# Patient Record
Sex: FEMALE | Race: WHITE | Employment: FULL TIME | URBAN - METROPOLITAN AREA
[De-identification: names, ages, dates, MRNs, and addresses within clinical notes are randomized per-mention and may not be internally consistent; named-entity substitution may affect disease eponyms.]

---

## 2021-04-21 ENCOUNTER — HOSPITAL ENCOUNTER (OUTPATIENT)
Age: 41
Discharge: HOME | End: 2021-04-23

## 2021-10-11 ENCOUNTER — OFFICE VISIT (OUTPATIENT)
Dept: PRIMARY CARE CLINIC | Age: 41
End: 2021-10-11
Payer: COMMERCIAL

## 2021-10-11 VITALS
RESPIRATION RATE: 18 BRPM | OXYGEN SATURATION: 98 % | BODY MASS INDEX: 38.64 KG/M2 | HEART RATE: 112 BPM | TEMPERATURE: 98 F | HEIGHT: 60 IN | SYSTOLIC BLOOD PRESSURE: 104 MMHG | DIASTOLIC BLOOD PRESSURE: 80 MMHG | WEIGHT: 196.8 LBS

## 2021-10-11 DIAGNOSIS — R05.9 COUGH: Primary | ICD-10-CM

## 2021-10-11 DIAGNOSIS — J39.2 THROAT IRRITATION: ICD-10-CM

## 2021-10-11 DIAGNOSIS — R43.0 LOSS OF SMELL: ICD-10-CM

## 2021-10-11 DIAGNOSIS — R43.2 LOSS OF TASTE: ICD-10-CM

## 2021-10-11 LAB
Lab: ABNORMAL
PERFORMING INSTRUMENT: ABNORMAL
QC PASS/FAIL: ABNORMAL
SARS-COV-2, POC: DETECTED

## 2021-10-11 PROCEDURE — 87426 SARSCOV CORONAVIRUS AG IA: CPT | Performed by: NURSE PRACTITIONER

## 2021-10-11 PROCEDURE — 99213 OFFICE O/P EST LOW 20 MIN: CPT | Performed by: NURSE PRACTITIONER

## 2021-10-11 RX ORDER — BENZONATATE 100 MG/1
100 CAPSULE ORAL 3 TIMES DAILY PRN
Qty: 30 CAPSULE | Refills: 0 | Status: SHIPPED | OUTPATIENT
Start: 2021-10-11 | End: 2021-10-18

## 2021-10-11 NOTE — PROGRESS NOTES
Chief Complaint   Other (cough, mucous,loss of taste and smell,irritated throat) and Other (onset symptoms on 10/03)      History of Present Illness   Source of history provided by:  patient. Claudia Patel is a 37 y.o. old female who presents to the walk in clinic with complaints of Chills, Headache, Pharyngitis, Post nasal drip, productive Cough, Patient was exposed to someone who is a known Covid-19 infected person or directly caring for such person and loss of smell and taste x 9 days. States symptoms have stayed the same since onset. Has been taking Mucinex DM, which has been  not very effective without symptomatic relief. Denies any Fever, Shortness of breath, Nausea, Vomiting, Chest Pain, LE Edema, Abdominal Pain, Rash or Lethargy. Denies any hx of asthma, COPD, emphysema or pneumonia. ROS   Pertinent positives and negatives are stated within HPI, all other systems reviewed and are negative. Past Medical History:  has no past medical history on file. Past Surgical History:  has no past surgical history on file. Social History:  reports that she has never smoked. She has never used smokeless tobacco.  Family History: family history is not on file. Allergies: Azithromycin, Erythromycin, Iodine, and Shellfish allergy    Physical Exam   Vital Signs:  /80   Pulse 112   Temp 98 °F (36.7 °C)   Resp 18   Ht 5' 0.05\" (1.525 m)   Wt 196 lb 12.8 oz (89.3 kg)   SpO2 98%   BMI 38.37 kg/m²    Oxygen Saturation Interpretation: Normal.    Constitutional:  Alert, development consistent with age. NAD. Head:  NC/NT. Airway patent. Ears: TMs clear bilaterally. Canals without exudate or swelling bilaterally. Mouth: Posterior pharynx with mild erythema and clear postnasal drip. No tonsillar hypertrophy or exudate. Neck:  Normal ROM. Supple. No anterior cervical adenopathy noted. Lungs: CTAB without wheezes, rales, or rhonchi.    CV:  Tachycardic rate, but normal rhythm, normal heart sounds, without pathological murmurs, ectopy, gallops, or rubs. Skin:  Normal turgor. Warm, dry, without visible rash. Lymphatic: No lymphangitis or adenopathy noted. Neurological:  Oriented. Motor functions intact. Lab / Imaging Results   (All laboratory and radiology results have been personally reviewed by myself)  Labs:  Results for orders placed or performed in visit on 10/11/21   POCT COVID-19, Antigen   Result Value Ref Range    SARS-COV-2, POC Detected (A) Not Detected    Lot Number [id number]     QC Pass/Fail pass     Performing Instrument BD Veritor        Imaging: All Radiology results interpreted by Radiologist unless otherwise noted. No results found. Medical Decision Making   Pt non-toxic, in no apparent distress and stable at time of discharge. Assessment/Plan   Sadie Cutler was seen today for other and other. Diagnoses and all orders for this visit:    Cough  -     POCT COVID-19, Antigen  -     COVID-19 Ambulatory; Future  -     benzonatate (TESSALON) 100 MG capsule; Take 1 capsule by mouth 3 times daily as needed for Cough  -     POCT COVID-19, Antigen    Loss of smell  -     POCT COVID-19, Antigen    Loss of taste  -     POCT COVID-19, Antigen    Throat irritation        Rapid Covid swab noted to be positive. Advised self-quarantine at home for 5 more days, as that is when her loss of smell and taste occurred. Work excuse provided to patient today. Increase fluids and rest. Symptomatic relief discussed including Tylenol prn pain/fever. Schedule f/u with PCP in 7-10 days if symptoms persist. ED sooner if symptoms worsen or change. ED immediately with high or refractory fever, progressive SOB, dyspnea, CP, calf pain/swelling, shaking chills, vomiting, abdominal pain, lethargy, flank pain, or decreased urinary output. Pt verbalizes understanding and is in agreement with plan of care. All questions answered.     ADELA Mckinnon NP    This visit was provided as a focused evaluation during the COVID -19 pandemic/national emergency. A comprehensive review of all previous patient history and testing was not conducted. Pertinent findings were elicited during the visit. *NOTE: This report was transcribed using voice recognition software. Every effort was made to ensure accuracy; however, inadvertent computerized transcription errors may be present.

## 2021-10-11 NOTE — LETTER
[address] [facility] [address]  Phone: [phone]  Fax: [phone]    Subhash Lugo, ADELA - NP        October 11, 2021     Patient: Fatmata Castillo   YOB: 1980   Date of Visit: 10/11/2021       To Whom It May Concern: It is my medical opinion that Fatmata Castillo should remain out of work until she receives a negative send out COVID-19 swab, or if positive, 10 days after the onset of her symptoms. If you have any questions or concerns, please don't hesitate to call.     Sincerely,        Subhash Lugo, ADELA - NP

## 2021-11-24 ENCOUNTER — TELEPHONE (OUTPATIENT)
Dept: PRIMARY CARE CLINIC | Age: 41
End: 2021-11-24

## 2021-12-08 ENCOUNTER — OFFICE VISIT (OUTPATIENT)
Dept: PRIMARY CARE CLINIC | Age: 41
End: 2021-12-08
Payer: COMMERCIAL

## 2021-12-08 VITALS
DIASTOLIC BLOOD PRESSURE: 76 MMHG | HEART RATE: 86 BPM | BODY MASS INDEX: 38.21 KG/M2 | OXYGEN SATURATION: 98 % | WEIGHT: 196 LBS | SYSTOLIC BLOOD PRESSURE: 110 MMHG | TEMPERATURE: 97.2 F

## 2021-12-08 DIAGNOSIS — M54.41 CHRONIC BILATERAL LOW BACK PAIN WITH BILATERAL SCIATICA: ICD-10-CM

## 2021-12-08 DIAGNOSIS — G89.29 CHRONIC BILATERAL LOW BACK PAIN WITH BILATERAL SCIATICA: ICD-10-CM

## 2021-12-08 DIAGNOSIS — G43.109 MIGRAINE WITH AURA AND WITHOUT STATUS MIGRAINOSUS, NOT INTRACTABLE: Primary | ICD-10-CM

## 2021-12-08 DIAGNOSIS — M54.42 CHRONIC BILATERAL LOW BACK PAIN WITH BILATERAL SCIATICA: ICD-10-CM

## 2021-12-08 DIAGNOSIS — Z87.898 HISTORY OF PALPITATIONS: ICD-10-CM

## 2021-12-08 PROCEDURE — 99213 OFFICE O/P EST LOW 20 MIN: CPT | Performed by: NURSE PRACTITIONER

## 2021-12-08 SDOH — ECONOMIC STABILITY: FOOD INSECURITY: WITHIN THE PAST 12 MONTHS, THE FOOD YOU BOUGHT JUST DIDN'T LAST AND YOU DIDN'T HAVE MONEY TO GET MORE.: NEVER TRUE

## 2021-12-08 SDOH — ECONOMIC STABILITY: FOOD INSECURITY: WITHIN THE PAST 12 MONTHS, YOU WORRIED THAT YOUR FOOD WOULD RUN OUT BEFORE YOU GOT MONEY TO BUY MORE.: NEVER TRUE

## 2021-12-08 ASSESSMENT — VISUAL ACUITY: OU: 1

## 2021-12-08 ASSESSMENT — ENCOUNTER SYMPTOMS
EYE REDNESS: 0
DIARRHEA: 0
PHOTOPHOBIA: 0
FACIAL SWELLING: 0
ABDOMINAL PAIN: 0
CHEST TIGHTNESS: 0
CONSTIPATION: 0
COLOR CHANGE: 0
EYE DISCHARGE: 0
SHORTNESS OF BREATH: 0
SORE THROAT: 0
SINUS PAIN: 0
EYE PAIN: 0
BACK PAIN: 1
VOMITING: 0
WHEEZING: 0
EYE ITCHING: 0
COUGH: 0
ABDOMINAL DISTENTION: 0
TROUBLE SWALLOWING: 0
VOICE CHANGE: 0
CHOKING: 0
RHINORRHEA: 0
NAUSEA: 0
BLOOD IN STOOL: 0
SINUS PRESSURE: 0

## 2021-12-08 ASSESSMENT — PATIENT HEALTH QUESTIONNAIRE - PHQ9
1. LITTLE INTEREST OR PLEASURE IN DOING THINGS: 0
SUM OF ALL RESPONSES TO PHQ QUESTIONS 1-9: 0
2. FEELING DOWN, DEPRESSED OR HOPELESS: 0
SUM OF ALL RESPONSES TO PHQ QUESTIONS 1-9: 0
SUM OF ALL RESPONSES TO PHQ9 QUESTIONS 1 & 2: 0
SUM OF ALL RESPONSES TO PHQ QUESTIONS 1-9: 0

## 2021-12-08 ASSESSMENT — SOCIAL DETERMINANTS OF HEALTH (SDOH): HOW HARD IS IT FOR YOU TO PAY FOR THE VERY BASICS LIKE FOOD, HOUSING, MEDICAL CARE, AND HEATING?: NOT HARD AT ALL

## 2021-12-08 NOTE — PROGRESS NOTES
21  Cropsey Petit : 1980 Sex: female  Age: 37 y.o. Chief Complaint   Patient presents with   [address] [place] [address] Street is seen today to establish as new patient with the practice. She reports that she is doing fairly well today and that she has no acute complaints. She does report that she has a history of migraines with aura, palpitations, and lumbar back pain. She states that these ailments are not flared up at this time. She does report a daily headache, but states it has not progressed to the stage of a migraine. I did review her vital signs, allergies, medications, medical history, social history, surgical history, smoking history, and family history. She denies any acute confusion, forgetfulness, or any change in cognition. Review of Systems   Constitutional: Negative for appetite change, chills, diaphoresis, fatigue, fever and unexpected weight change. HENT: Negative for congestion, ear pain, facial swelling, hearing loss, nosebleeds, postnasal drip, rhinorrhea, sinus pressure, sinus pain, sneezing, sore throat, tinnitus, trouble swallowing and voice change. Eyes: Negative for photophobia, pain, discharge, redness and itching. Respiratory: Negative for cough, choking, chest tightness, shortness of breath and wheezing. Cardiovascular: Positive for palpitations (Has a history of PVCs). Negative for chest pain and leg swelling. Gastrointestinal: Negative for abdominal distention, abdominal pain, blood in stool, constipation, diarrhea, nausea and vomiting. Endocrine: Negative for cold intolerance, heat intolerance, polydipsia, polyphagia and polyuria. Genitourinary: Negative for difficulty urinating, dysuria, flank pain, frequency, hematuria and urgency. Musculoskeletal: Positive for back pain (Lumbar/SI joint region, chronic issue). Negative for arthralgias, gait problem, joint swelling, myalgias, neck pain and neck stiffness.    Skin: Negative for color change, rash and wound. Allergic/Immunologic: Negative for environmental allergies and food allergies. Neurological: Positive for headaches (\"Always\", does have a history of migraines. ). Negative for dizziness, tremors, seizures, syncope, facial asymmetry, speech difficulty, weakness, light-headedness and numbness. Hematological: Does not bruise/bleed easily. Psychiatric/Behavioral: Negative for agitation, behavioral problems, confusion, decreased concentration, hallucinations, self-injury, sleep disturbance and suicidal ideas. The patient is not nervous/anxious. No current outpatient medications on file.   Allergies   Allergen Reactions    Azithromycin     Erythromycin     Iodine     Shellfish Allergy        Past Medical History:   Diagnosis Date    Migraine      Past Surgical History:   Procedure Laterality Date    ARM SURGERY Right      SECTION      HYSTERECTOMY, VAGINAL      WISDOM TOOTH EXTRACTION       Family History   Problem Relation Age of Onset    Thyroid Disease Mother     High Cholesterol Father     Hypertension Father     Coronary Art Dis Father     No Known Problems Sister     No Known Problems Brother      Social History     Socioeconomic History    Marital status:      Spouse name: Not on file    Number of children: Not on file    Years of education: Not on file    Highest education level: Not on file   Occupational History    Not on file   Tobacco Use    Smoking status: Never Smoker    Smokeless tobacco: Never Used   Substance and Sexual Activity    Alcohol use: Not on file    Drug use: Not on file    Sexual activity: Not on file   Other Topics Concern    Not on file   Social History Narrative    Not on file     Social Determinants of Health     Financial Resource Strain: Low Risk     Difficulty of Paying Living Expenses: Not hard at all   Food Insecurity: No Food Insecurity    Worried About Running Out of Food in the Last Year: Never true    Rick of Food in the Last Year: Never true   Transportation Needs:     Lack of Transportation (Medical): Not on file    Lack of Transportation (Non-Medical): Not on file   Physical Activity:     Days of Exercise per Week: Not on file    Minutes of Exercise per Session: Not on file   Stress:     Feeling of Stress : Not on file   Social Connections:     Frequency of Communication with Friends and Family: Not on file    Frequency of Social Gatherings with Friends and Family: Not on file    Attends [employer] Services: Not on file    Active Member of [address] [place] [address] [employer] or Organizations: Not on file    Attends Club or Organization Meetings: Not on file    Marital Status: Not on file   Intimate Partner Violence:     Fear of Current or Ex-Partner: Not on file    Emotionally Abused: Not on file    Physically Abused: Not on file    Sexually Abused: Not on file   Housing Stability:     Unable to Pay for Housing in the Last Year: Not on file    Number of [place] in the Last Year: Not on file    Unstable Housing in the Last Year: Not on file       Vitals:    12/08/21 1205   BP: 110/76   Pulse: 86   Temp: 97.2 °F (36.2 °C)   TempSrc: Temporal   SpO2: 98%   Weight: 196 lb (88.9 kg)       Physical Exam  Vitals and nursing note reviewed. Constitutional:       General: She is awake. She is not in acute distress. Appearance: Normal appearance. She is well-developed. She is obese. She is not ill-appearing, toxic-appearing or diaphoretic. HENT:      Head: Normocephalic and atraumatic. Right Ear: Hearing, tympanic membrane, ear canal and external ear normal. There is no impacted cerumen. Left Ear: Hearing, tympanic membrane, ear canal and external ear normal. There is no impacted cerumen. Nose: Nose normal. No congestion or rhinorrhea. Mouth/Throat:      Lips: Pink. No lesions. Mouth: Mucous membranes are moist.      Pharynx: Oropharynx is clear.  No oropharyngeal exudate or posterior oropharyngeal erythema. Eyes:      General: Lids are normal. Vision grossly intact. Gaze aligned appropriately. No scleral icterus. Right eye: No discharge. Left eye: No discharge. Extraocular Movements: Extraocular movements intact. Conjunctiva/sclera: Conjunctivae normal.      Right eye: Right conjunctiva is not injected. Left eye: Left conjunctiva is not injected. Pupils: Pupils are equal, round, and reactive to light. Neck:      Thyroid: No thyromegaly. Vascular: No carotid bruit. Trachea: Trachea normal.   Cardiovascular:      Rate and Rhythm: Normal rate and regular rhythm. Pulses: Normal pulses. Heart sounds: Normal heart sounds, S1 normal and S2 normal. No murmur heard. No friction rub. No gallop. Pulmonary:      Effort: Pulmonary effort is normal. No tachypnea, accessory muscle usage or respiratory distress. Breath sounds: Normal breath sounds and air entry. No stridor. No wheezing, rhonchi or rales. Chest:      Chest wall: No tenderness. Abdominal:      General: Bowel sounds are normal. There is no distension. Palpations: Abdomen is soft. There is no mass. Tenderness: There is no abdominal tenderness. There is no right CVA tenderness, left CVA tenderness, guarding or rebound. Hernia: No hernia is present. Musculoskeletal:         General: No swelling, tenderness, deformity or signs of injury. Normal range of motion. Cervical back: Full passive range of motion without pain, normal range of motion and neck supple. No rigidity. No muscular tenderness. Right lower leg: No edema. Left lower leg: No edema. Lymphadenopathy:      Cervical: No cervical adenopathy. Skin:     General: Skin is warm and dry. Capillary Refill: Capillary refill takes less than 2 seconds. Coloration: Skin is not jaundiced or pale. Findings: No bruising, erythema, lesion or rash.    Neurological:      General: No focal deficit present. Mental Status: She is alert and oriented to person, place, and time. Mental status is at baseline. Cranial Nerves: Cranial nerves are intact. No cranial nerve deficit. Sensory: Sensation is intact. No sensory deficit. Motor: Motor function is intact. No weakness. Coordination: Coordination is intact. Coordination normal.      Gait: Gait is intact. Gait normal.      Deep Tendon Reflexes: Reflexes normal.   Psychiatric:         Attention and Perception: Attention and perception normal.         Mood and Affect: Mood and affect normal.         Speech: Speech normal.         Behavior: Behavior normal. Behavior is cooperative. Thought Content: Thought content normal.         Cognition and Memory: Cognition and memory normal.         Judgment: Judgment normal.         Assessment and Plan:  Isa Washington was seen today for established new doctor. Diagnoses and all orders for this visit:    Migraine with aura and without status migrainosus, not intractable  -     CBC Auto Differential; Future  -     Comprehensive Metabolic Panel; Future    History of palpitations    Chronic bilateral low back pain with bilateral sciatica    BMI 38.0-38.9,adult  -     Lipid Panel; Future        Discussions/Education provided to patients during visit:  [] Discussed the importance to stop smoking. [x] Advised to monitor eating habits. [] Reviewed and discussed Imaging results. [] Reviewed and discussed Lab results. [x] Discussed the importance of drinking plenty of fluids. [] Cut down on Salt, Caffeine, and Sugar. [x] Continue Medications as Discussed. [x] Communicated with patient any concerns, to phone office. Return in about 4 weeks (around 1/5/2022) for Review of chronic conditions, Lab Review. I spent 30 minutes face-to-face with this patient.       Seen By:  ADELA Correia NP

## 2021-12-24 LAB
ALBUMIN SERPL-MCNC: NORMAL G/DL
ALP BLD-CCNC: NORMAL U/L
ALT SERPL-CCNC: NORMAL U/L
ANION GAP SERPL CALCULATED.3IONS-SCNC: NORMAL MMOL/L
AST SERPL-CCNC: NORMAL U/L
BASOPHILS ABSOLUTE: NORMAL
BASOPHILS RELATIVE PERCENT: NORMAL
BILIRUB SERPL-MCNC: NORMAL MG/DL
BUN BLDV-MCNC: NORMAL MG/DL
CALCIUM SERPL-MCNC: NORMAL MG/DL
CHLORIDE BLD-SCNC: NORMAL MMOL/L
CHOLESTEROL, TOTAL: NORMAL
CHOLESTEROL/HDL RATIO: NORMAL
CO2: NORMAL
CREAT SERPL-MCNC: NORMAL MG/DL
EOSINOPHILS ABSOLUTE: NORMAL
EOSINOPHILS RELATIVE PERCENT: NORMAL
GFR CALCULATED: NORMAL
GLUCOSE BLD-MCNC: NORMAL MG/DL
HCT VFR BLD CALC: NORMAL %
HDLC SERPL-MCNC: NORMAL MG/DL
HEMOGLOBIN: NORMAL
LDL CHOLESTEROL CALCULATED: NORMAL
LYMPHOCYTES ABSOLUTE: NORMAL
LYMPHOCYTES RELATIVE PERCENT: NORMAL
MCH RBC QN AUTO: NORMAL PG
MCHC RBC AUTO-ENTMCNC: NORMAL G/DL
MCV RBC AUTO: NORMAL FL
MONOCYTES ABSOLUTE: NORMAL
MONOCYTES RELATIVE PERCENT: NORMAL
NEUTROPHILS ABSOLUTE: NORMAL
NEUTROPHILS RELATIVE PERCENT: NORMAL
NONHDLC SERPL-MCNC: NORMAL MG/DL
PDW BLD-RTO: NORMAL %
PLATELET # BLD: NORMAL 10*3/UL
PMV BLD AUTO: NORMAL FL
POTASSIUM SERPL-SCNC: NORMAL MMOL/L
RBC # BLD: NORMAL 10*6/UL
SODIUM BLD-SCNC: NORMAL MMOL/L
TOTAL PROTEIN: NORMAL
TRIGL SERPL-MCNC: NORMAL MG/DL
VLDLC SERPL CALC-MCNC: NORMAL MG/DL
WBC # BLD: NORMAL 10*3/UL

## 2021-12-29 DIAGNOSIS — G43.109 MIGRAINE WITH AURA AND WITHOUT STATUS MIGRAINOSUS, NOT INTRACTABLE: ICD-10-CM

## 2022-01-11 ENCOUNTER — OFFICE VISIT (OUTPATIENT)
Dept: PRIMARY CARE CLINIC | Age: 42
End: 2022-01-11
Payer: COMMERCIAL

## 2022-01-11 VITALS
BODY MASS INDEX: 38.6 KG/M2 | TEMPERATURE: 97.8 F | WEIGHT: 198 LBS | SYSTOLIC BLOOD PRESSURE: 110 MMHG | HEART RATE: 100 BPM | OXYGEN SATURATION: 98 % | DIASTOLIC BLOOD PRESSURE: 72 MMHG

## 2022-01-11 DIAGNOSIS — R50.9 FEVER, UNSPECIFIED FEVER CAUSE: ICD-10-CM

## 2022-01-11 DIAGNOSIS — J02.9 PHARYNGITIS, UNSPECIFIED ETIOLOGY: ICD-10-CM

## 2022-01-11 DIAGNOSIS — J02.9 PHARYNGITIS, UNSPECIFIED ETIOLOGY: Primary | ICD-10-CM

## 2022-01-11 LAB
Lab: NORMAL
PERFORMING INSTRUMENT: NORMAL
QC PASS/FAIL: NORMAL
S PYO AG THROAT QL: NORMAL
SARS-COV-2, POC: NORMAL

## 2022-01-11 PROCEDURE — 87880 STREP A ASSAY W/OPTIC: CPT | Performed by: NURSE PRACTITIONER

## 2022-01-11 PROCEDURE — 87426 SARSCOV CORONAVIRUS AG IA: CPT | Performed by: NURSE PRACTITIONER

## 2022-01-11 PROCEDURE — 99213 OFFICE O/P EST LOW 20 MIN: CPT | Performed by: NURSE PRACTITIONER

## 2022-01-11 RX ORDER — AMOXICILLIN AND CLAVULANATE POTASSIUM 875; 125 MG/1; MG/1
1 TABLET, FILM COATED ORAL 2 TIMES DAILY
Qty: 20 TABLET | Refills: 0 | Status: SHIPPED | OUTPATIENT
Start: 2022-01-11 | End: 2022-01-21

## 2022-01-11 NOTE — PROGRESS NOTES
Chief Complaint   Concern For COVID-19 (st, fever, since last night )      History of Present Illness   Source of history provided by:  patient. Kandace Hopkins is a 37 y.o. old female who presents to the walk in clinic with complaints of Fever, Pharyngitis and Post nasal drip x 1 days. States symptoms have stayed the same since onset. Has been taking Acetaminophen without symptomatic relief. Denies any Cough, Shortness of breath, Nausea, Vomiting, Chest Pain, LE Edema, Abdominal Pain, Rash or Lethargy. Denies any hx of asthma, COPD, emphysema or pneumonia. ROS   Pertinent positives and negatives are stated within HPI, all other systems reviewed and are negative. Past Medical History:  has a past medical history of Migraine. Past Surgical History:  has a past surgical history that includes Hysterectomy, vaginal;  section; Arm Surgery (Right); and [place] tooth extraction. Social History:  reports that she has never smoked. She has never used smokeless tobacco.  Family History: family history includes Coronary Art Dis in her father; High Cholesterol in her father; Hypertension in her father; No Known Problems in her brother and sister; Thyroid Disease in her mother. Allergies: Azithromycin, Erythromycin, Iodine, and Shellfish allergy    Physical Exam   Vital Signs:  /72   Pulse 100   Temp 97.8 °F (36.6 °C) (Temporal)   Wt 198 lb (89.8 kg)   SpO2 98%   BMI 38.60 kg/m²    Oxygen Saturation Interpretation: Normal.    Constitutional:  Alert, development consistent with age. NAD. Head:  NC/NT. Airway patent. Ears: TMs clear bilaterally. Canals without exudate or swelling bilaterally. Mouth: Posterior pharynx with moderate erythema and clear postnasal drip. +1-2 tonsillar hypertrophy or exudate. Neck:  Normal ROM. Supple. No anterior cervical adenopathy noted. Lungs: CTAB without wheezes, rales, or rhonchi.    CV:  Regular rate and rhythm, normal heart sounds, without pathological murmurs, ectopy, gallops, or rubs. Skin:  Normal turgor. Warm, dry, without visible rash. Lymphatic: No lymphangitis or adenopathy noted. Neurological:  Oriented. Motor functions intact. Lab / Imaging Results   (All laboratory and radiology results have been personally reviewed by myself)  Labs:  Results for orders placed or performed in visit on 01/11/22   POCT COVID-19, Antigen   Result Value Ref Range    SARS-COV-2, POC Not-Detected Not Detected    Lot Number [id number]     QC Pass/Fail pass     Performing Instrument BD Veritor    POCT rapid strep A   Result Value Ref Range    Strep A Ag None Detected None Detected       Imaging: All Radiology results interpreted by Radiologist unless otherwise noted. No results found. Medical Decision Making   Pt non-toxic, in no apparent distress and stable at time of discharge. Assessment/Plan   Diana Alvarez was seen today for concern for covid-19. Diagnoses and all orders for this visit:    Pharyngitis, unspecified etiology  -     POCT COVID-19, Antigen  -     POCT rapid strep A  -     Culture, Throat; Future  -     amoxicillin-clavulanate (AUGMENTIN) 875-125 MG per tablet; Take 1 tablet by mouth 2 times daily for 10 days    Fever, unspecified fever cause  -     POCT COVID-19, Antigen  -     POCT rapid strep A        Rapid COVID-19 and rapid strep swab obtained and noted to be negative. Work excuse provided to patient today. Increase fluids and rest. Symptomatic relief discussed including Tylenol prn pain/fever. Schedule f/u with PCP in 7-10 days if symptoms persist. ED sooner if symptoms worsen or change. ED immediately with high or refractory fever, progressive SOB, dyspnea, CP, calf pain/swelling, shaking chills, vomiting, abdominal pain, lethargy, flank pain, or decreased urinary output. Pt verbalizes understanding and is in agreement with plan of care. All questions answered.     Nazia Garcia, ADELA - NP    This visit was provided as a focused evaluation during the COVID -19 pandemic/national emergency. A comprehensive review of all previous patient history and testing was not conducted. Pertinent findings were elicited during the visit. *NOTE: This report was transcribed using voice recognition software. Every effort was made to ensure accuracy; however, inadvertent computerized transcription errors may be present.

## 2022-01-14 LAB — THROAT CULTURE: NORMAL

## 2022-01-20 ENCOUNTER — TELEPHONE (OUTPATIENT)
Dept: PRIMARY CARE CLINIC | Age: 42
End: 2022-01-20

## 2022-01-20 RX ORDER — FLUCONAZOLE 100 MG/1
100 TABLET ORAL DAILY
Qty: 7 TABLET | Refills: 0 | Status: SHIPPED | OUTPATIENT
Start: 2022-01-20 | End: 2022-01-27

## 2022-07-12 ENCOUNTER — TELEPHONE (OUTPATIENT)
Dept: PRIMARY CARE CLINIC | Age: 42
End: 2022-07-12

## 2022-07-12 DIAGNOSIS — F41.9 ANXIETY: Primary | ICD-10-CM

## 2022-07-12 RX ORDER — DIAZEPAM 5 MG/1
5 TABLET ORAL ONCE
Qty: 1 TABLET | Refills: 0 | Status: SHIPPED | OUTPATIENT
Start: 2022-07-12 | End: 2022-07-12

## 2022-11-29 ENCOUNTER — NURSE TRIAGE (OUTPATIENT)
Dept: OTHER | Facility: CLINIC | Age: 42
End: 2022-11-29

## 2022-11-29 NOTE — TELEPHONE ENCOUNTER
Location of patient: [facility]     Location of employment: [employer]    Department where injury occurred:x ray room    Location of injury (body part involved): right index finger    Time of injury: 1230    Last 4 of SSN: [id number]    Subjective: Caller states \"states while placing an xray cassette got a splinter in her right index finger \"     Current Symptoms: got a small splinter in right index finger and removed the splinter and cleaned and bandaid to finger does not need tx t for injury but does need a tetanus shot but does not want to go to er so told her to call [facility]    Pain Severity: soreness/burning    Temperature: none       What has been tried: cleaned and removed the splinter    LMP:  hyst  Pregnant: NA    Recommended disposition: See PCP within 24 Hours    Care advice provided, caller verbalizes understanding; denies any other questions or concerns.       Reason for Disposition   No prior tetanus shots (or is not fully vaccinated) and any wound (e.g., cut or scrape)    Protocols used: Finger Injury-ADULT-[place]

## 2022-11-30 ENCOUNTER — OFFICE VISIT (OUTPATIENT)
Dept: FAMILY MEDICINE CLINIC | Age: 42
End: 2022-11-30
Payer: COMMERCIAL

## 2022-11-30 VITALS
OXYGEN SATURATION: 97 % | WEIGHT: 212 LBS | SYSTOLIC BLOOD PRESSURE: 110 MMHG | HEIGHT: 61 IN | TEMPERATURE: 97.8 F | BODY MASS INDEX: 40.02 KG/M2 | DIASTOLIC BLOOD PRESSURE: 72 MMHG | HEART RATE: 119 BPM

## 2022-11-30 DIAGNOSIS — Z23 NEED FOR TETANUS BOOSTER: ICD-10-CM

## 2022-11-30 DIAGNOSIS — S61.239A PUNCTURE WOUND OF FINGER, INITIAL ENCOUNTER: Primary | ICD-10-CM

## 2022-11-30 PROCEDURE — 99203 OFFICE O/P NEW LOW 30 MIN: CPT | Performed by: PHYSICIAN ASSISTANT

## 2022-11-30 PROCEDURE — 90471 IMMUNIZATION ADMIN: CPT | Performed by: PHYSICIAN ASSISTANT

## 2022-11-30 PROCEDURE — 90715 TDAP VACCINE 7 YRS/> IM: CPT | Performed by: PHYSICIAN ASSISTANT

## 2022-11-30 RX ORDER — CEPHALEXIN 500 MG/1
500 CAPSULE ORAL 3 TIMES DAILY
Qty: 21 CAPSULE | Refills: 0 | Status: SHIPPED | OUTPATIENT
Start: 2022-11-30 | End: 2022-12-07

## 2022-11-30 NOTE — PROGRESS NOTES
22  Al Walls : 1980 Sex: female  Age 40 y.o. Subjective:  Chief Complaint   Patient presents with    Finger Injury     Piece of plastic stuck in finger. Pt states that she pulled the plastic out. Needs tdap updated. HPI:   Al Walls , 40 y.o. female presents to express care for evaluation of puncture wound right index finger    HPI  52-year-old female presents to express care for evaluation of puncture wound to the right finger. The patient had sustained a puncture wound from a please plastic of her cassette from x-ray. The patient is unsure of her last tetanus. The patient had cleaned and irrigated the wound complete topical antibiotic ointment on the area. The patient was concerned for the possibility infection although there is no significant erythema or warmth. The patient is not having any wrist pain or elbow pain. ROS:   Unless otherwise stated in this report the patient's positive and negative responses for review of systems for constitutional, eyes, ENT, cardiovascular, respiratory, gastrointestinal, neurological, , musculoskeletal, and integument systems and related systems to the presenting problem are either stated in the history of present illness or were not pertinent or were negative for the symptoms and/or complaints related to the presenting medical problem. Positives and pertinent negatives as per HPI. All others reviewed and are negative.       PMH:     Past Medical History:   Diagnosis Date    Migraine        Past Surgical History:   Procedure Laterality Date    ARM SURGERY Right      SECTION      HYSTERECTOMY, VAGINAL      WISDOM TOOTH EXTRACTION         Family History   Problem Relation Age of Onset    Thyroid Disease Mother     High Cholesterol Father     Hypertension Father     Coronary Art Dis Father     No Known Problems Sister     No Known Problems Brother        Medications:     Current Outpatient Medications:     cephALEXin (KEFLEX) 500 MG capsule, Take 1 capsule by mouth 3 times daily for 7 days, Disp: 21 capsule, Rfl: 0    mupirocin (BACTROBAN) 2 % ointment, Apply topically 3 times daily. , Disp: 30 g, Rfl: 0    Allergies: Allergies   Allergen Reactions    Azithromycin     Erythromycin     Iodine     Shellfish Allergy        Social History:     Social History     Tobacco Use    Smoking status: Never    Smokeless tobacco: Never       Patient lives at home. Physical Exam:     Vitals:    11/30/22 0907   BP: 110/72   Pulse: (!) 119   Temp: 97.8 °F (36.6 °C)   SpO2: 97%   Weight: 212 lb (96.2 kg)   Height: 5' 0.5\" (1.537 m)       Exam:  Physical Exam  Vital signs reviewed and nurse's notes. The patient is not hypoxic. General: Alert, no acute distress, patient resting comfortably   Skin: warm, intact, no pallor noted   Head: Normocephalic, atraumatic   Eye: Normal conjunctiva   Respiratory: No acute distress   Musculoskeletal: The patient does have evidence of a puncture wound noted to the radial aspect on the palmar aspect of the proximal phalanx. The patient does have evidence of a slight abrasion in the area as well. There is no evidence of palpable foreign body. The patient has good flexion, extension. The digit is not stuck in flexion. The patient has no significant pain along the flexor tendon sheath. The patient has no pain with extension. Pulses intact. Normal capillary refill. Neurological: alert and orient x4, normal sensory and motor observed. Psychiatric: Cooperative      Testing:           Medical Decision Making:     Vital signs reviewed    Past medical history reviewed. Allergies reviewed. Medications reviewed. Patient on arrival does not appear to be in any apparent distress or discomfort. The patient has been seen and evaluated. The patient does not appear to be toxic or lethargic. The patient will have topical antibiotic ointment applied and a dressing.   The patient will also be started on cephalexin and mupirocin for home. The patient will have tetanus updated. The patient is to return to express care or go directly to the emergency department should any of the signs or symptoms worsen. The patient is to followup with primary care physician in 2-3 days for repeat evaluation. The patient has no other questions or concerns at this time the patient will be discharged home. Clinical Impression:   Milagros Dalton was seen today for finger injury. Diagnoses and all orders for this visit:    Puncture wound of finger, initial encounter    Need for tetanus booster    Other orders  -     Tdap, BOOSTRIX, (age 8 yrs+), IM  -     cephALEXin (KEFLEX) 500 MG capsule; Take 1 capsule by mouth 3 times daily for 7 days  -     mupirocin (BACTROBAN) 2 % ointment; Apply topically 3 times daily. The patient is to call for any concerns or return if any of the signs or symptoms worsen. The patient is to follow-up with PCP in the next 2-3 days for repeat evaluation repeat assessment or go directly to the emergency department.      SIGNATURE: Sabina Babinski III, PA-C

## 2023-04-21 DIAGNOSIS — Z91.013 ALLERGY TO SEAFOOD: Primary | ICD-10-CM

## 2023-04-21 RX ORDER — EPINEPHRINE 0.3 MG/.3ML
0.3 INJECTION SUBCUTANEOUS ONCE
Qty: 0.3 ML | Refills: 1 | Status: SHIPPED | OUTPATIENT
Start: 2023-04-21 | End: 2023-04-21

## 2024-06-19 DIAGNOSIS — Z91.013 ALLERGY TO SEAFOOD: ICD-10-CM

## 2024-06-19 RX ORDER — EPINEPHRINE 0.3 MG/.3ML
0.3 INJECTION SUBCUTANEOUS ONCE
Qty: 0.3 ML | Refills: 1 | Status: SHIPPED | OUTPATIENT
Start: 2024-06-19 | End: 2024-06-19

## 2024-06-19 NOTE — TELEPHONE ENCOUNTER
Patients last appointment 1/11/2022.  Patients next scheduled appointment   Future Appointments   Date Time Provider Department Center   9/4/2024 12:30 PM Jose Snowden APRN - [facility]

## 2024-09-01 ASSESSMENT — PATIENT HEALTH QUESTIONNAIRE - PHQ9
2. FEELING DOWN, DEPRESSED OR HOPELESS: NOT AT ALL
2. FEELING DOWN, DEPRESSED OR HOPELESS: NOT AT ALL
SUM OF ALL RESPONSES TO PHQ9 QUESTIONS 1 & 2: 0
1. LITTLE INTEREST OR PLEASURE IN DOING THINGS: NOT AT ALL
SUM OF ALL RESPONSES TO PHQ QUESTIONS 1-9: 0
SUM OF ALL RESPONSES TO PHQ QUESTIONS 1-9: 0
SUM OF ALL RESPONSES TO PHQ9 QUESTIONS 1 & 2: 0
1. LITTLE INTEREST OR PLEASURE IN DOING THINGS: NOT AT ALL
SUM OF ALL RESPONSES TO PHQ QUESTIONS 1-9: 0
SUM OF ALL RESPONSES TO PHQ QUESTIONS 1-9: 0

## 2024-09-04 ENCOUNTER — OFFICE VISIT (OUTPATIENT)
Dept: PRIMARY CARE CLINIC | Age: 44
End: 2024-09-04
Payer: COMMERCIAL

## 2024-09-04 VITALS
WEIGHT: 215.8 LBS | SYSTOLIC BLOOD PRESSURE: 110 MMHG | DIASTOLIC BLOOD PRESSURE: 72 MMHG | HEIGHT: 61 IN | OXYGEN SATURATION: 98 % | BODY MASS INDEX: 40.75 KG/M2 | HEART RATE: 86 BPM | TEMPERATURE: 97.9 F

## 2024-09-04 DIAGNOSIS — G43.109 MIGRAINE WITH AURA AND WITHOUT STATUS MIGRAINOSUS, NOT INTRACTABLE: Primary | ICD-10-CM

## 2024-09-04 DIAGNOSIS — M54.42 CHRONIC BILATERAL LOW BACK PAIN WITH BILATERAL SCIATICA: ICD-10-CM

## 2024-09-04 DIAGNOSIS — G89.29 CHRONIC BILATERAL LOW BACK PAIN WITH BILATERAL SCIATICA: ICD-10-CM

## 2024-09-04 DIAGNOSIS — Z87.898 HISTORY OF PALPITATIONS: ICD-10-CM

## 2024-09-04 DIAGNOSIS — M54.41 CHRONIC BILATERAL LOW BACK PAIN WITH BILATERAL SCIATICA: ICD-10-CM

## 2024-09-04 DIAGNOSIS — Z12.31 ENCOUNTER FOR SCREENING MAMMOGRAM FOR MALIGNANT NEOPLASM OF BREAST: ICD-10-CM

## 2024-09-04 PROCEDURE — 99213 OFFICE O/P EST LOW 20 MIN: CPT | Performed by: NURSE PRACTITIONER

## 2024-09-04 SDOH — ECONOMIC STABILITY: FOOD INSECURITY: WITHIN THE PAST 12 MONTHS, THE FOOD YOU BOUGHT JUST DIDN'T LAST AND YOU DIDN'T HAVE MONEY TO GET MORE.: NEVER TRUE

## 2024-09-04 SDOH — ECONOMIC STABILITY: INCOME INSECURITY: HOW HARD IS IT FOR YOU TO PAY FOR THE VERY BASICS LIKE FOOD, HOUSING, MEDICAL CARE, AND HEATING?: NOT HARD AT ALL

## 2024-09-04 SDOH — ECONOMIC STABILITY: FOOD INSECURITY: WITHIN THE PAST 12 MONTHS, YOU WORRIED THAT YOUR FOOD WOULD RUN OUT BEFORE YOU GOT MONEY TO BUY MORE.: NEVER TRUE

## 2024-09-04 ASSESSMENT — ENCOUNTER SYMPTOMS
BACK PAIN: 1
WHEEZING: 0
COUGH: 0
SINUS PRESSURE: 0
EYE PAIN: 0
RHINORRHEA: 0
ABDOMINAL PAIN: 0
COLOR CHANGE: 0
EYE DISCHARGE: 0
ABDOMINAL DISTENTION: 0
VOMITING: 0
EYE ITCHING: 0
CONSTIPATION: 0
SINUS PAIN: 0
PHOTOPHOBIA: 0
CHEST TIGHTNESS: 0
CHOKING: 0
SHORTNESS OF BREATH: 0
NAUSEA: 0
VOICE CHANGE: 0
TROUBLE SWALLOWING: 0
FACIAL SWELLING: 0
EYE REDNESS: 0
SORE THROAT: 0
DIARRHEA: 0
BLOOD IN STOOL: 0

## 2024-09-04 NOTE — PROGRESS NOTES
24  Evelyn Hamm : 1980 Sex: female  Age: 44 y.o.    Chief Complaint   Patient presents with    Annual Exam       Evelyn is seen today for an annual exam and a review of her overall condition and plan of care.  She reports that she is doing fairly well today and been staying very busy with work.  She has been under some increased stress as her son recently fractured his wrist, after wrecking an electric scooter.  She reports that he is doing fine.  She has been having some sharp, gas-like pains across her abdomen.  She states that she is not sure what may have caused this, as she has not found any correlation to what she has eaten or done throughout the day.  She continues to report chronic pain to her back, but states that when she goes to the chiropractor, it helps.  Lastly, she is still been having issues with headaches/migraines, but states that they have not been very bad.  She denies any further issues at this time.  She denies any acute confusion, forgetfulness or change in cognition.        Review of Systems   Constitutional:  Negative for appetite change, chills, diaphoresis, fatigue, fever and unexpected weight change.   HENT:  Negative for congestion, ear pain, facial swelling, hearing loss, nosebleeds, postnasal drip, rhinorrhea, sinus pressure, sinus pain, sneezing, sore throat, tinnitus, trouble swallowing and voice change.    Eyes:  Negative for photophobia, pain, discharge, redness and itching.   Respiratory:  Negative for cough, choking, chest tightness, shortness of breath and wheezing.    Cardiovascular:  Negative for chest pain, palpitations and leg swelling.   Gastrointestinal:  Negative for abdominal distention, abdominal pain (Occasionally has sharp, gas like pains across abdomen.  No understanding of what causes this, because \"it is so random\"), blood in stool, constipation, diarrhea, nausea and vomiting.   Endocrine: Negative for cold intolerance, heat intolerance,

## 2024-10-05 LAB
A/G RATIO: 1.1 RATIO (ref 1.1–1.8)
ALBUMIN: 3.7 G/DL (ref 3.4–5)
ALK PHOSPHATASE: 78 U/L (ref 45–117)
ALT SERPL-CCNC: 28 U/L (ref 12–78)
AST SERPL-CCNC: 14 U/L (ref 15–37)
BASOPHILS ABSOLUTE: 0 10 3/MCL (ref 0–0.3)
BASOPHILS RELATIVE PERCENT: 0.6 % (ref 0–2.5)
BILIRUB SERPL-MCNC: 0.6 MG/DL (ref 0.2–1)
BUN / CREAT RATIO: 11.3 RATIO (ref 10–22)
BUN BLDV-MCNC: 11 MG/DL (ref 7–18)
CALCIUM SERPL-MCNC: 9.1 MG/DL (ref 8.5–10.1)
CHLORIDE BLD-SCNC: 111 MMOL/L (ref 98–107)
CHOLESTEROL, TOTAL: 172 MCG/DL (ref 0–200)
CO2: 24 MMOL/L (ref 21–32)
CREAT SERPL-MCNC: 0.97 MG/DL (ref 0.55–1.02)
ELECTROLYTE BALANCE: 7 MEQ/L (ref 4–15)
EOSINOPHILS ABSOLUTE: 0.2 10 3/MCL (ref 0–0.7)
EOSINOPHILS RELATIVE PERCENT: 2.2 % (ref 0–6)
GFR AFRICAN AMERICAN: 76 ML/MIN/1.73SQM
GFR NON-AFRICAN AMERICAN: 63 ML/MIN/1.73SQM
GLOBULIN: 3.5 G/DL (ref 2.5–4.6)
GLUCOSE: 99 MG/DL (ref 70–100)
HCT VFR BLD CALC: 42.5 % (ref 34–46)
HDLC SERPL-MCNC: 47 MG/DL (ref 40–60)
HEMOGLOBIN: 14.7 G/DL (ref 12–16)
LDL CHOLESTEROL: 109 MG/DL (ref 0–130)
LYMPHOCYTES ABSOLUTE: 1.7 10 3/MCL (ref 0.9–4.3)
LYMPHOCYTES RELATIVE PERCENT: 23.3 % (ref 20–40)
MCH RBC QN AUTO: 31.4 PG (ref 27–33)
MCHC RBC AUTO-ENTMCNC: 34.7 G/DL (ref 32–36)
MCV RBC AUTO: 90.7 FL (ref 80–99)
MONOCYTES ABSOLUTE: 0.5 10 3/MCL (ref 0.1–1.4)
MONOCYTES RELATIVE PERCENT: 7 % (ref 2–13)
NEUTROPHILS ABSOLUTE: 4.9 10 3/MCL (ref 2.3–8.1)
NEUTROPHILS RELATIVE PERCENT: 66.9 % (ref 50–75)
PDW BLD-RTO: 12.8 % (ref 11.5–15.5)
PLATELET # BLD: 287 10 3/MCL (ref 150–450)
PMV BLD AUTO: 7.7 FL (ref 6.6–10.5)
POTASSIUM SERPL-SCNC: 4.1 MMOL/L (ref 3.5–5.1)
RBC # BLD: 4.69 10 6/MCL (ref 4.1–5.3)
SODIUM BLD-SCNC: 142 MMOL/L (ref 136–145)
TOTAL PROTEIN: 7.2 G/DL (ref 6–8.3)
TRIGL SERPL-MCNC: 81 MG/DL (ref 0–150)
TSH SERPL DL<=0.05 MIU/L-ACNC: 2.34 MIU/ML (ref 0.36–3.74)
WBC # BLD: 7.4 10 3/MCL (ref 4.5–10.8)

## 2024-12-17 ENCOUNTER — OFFICE VISIT (OUTPATIENT)
Dept: FAMILY MEDICINE CLINIC | Age: 44
End: 2024-12-17
Payer: COMMERCIAL

## 2024-12-17 VITALS
SYSTOLIC BLOOD PRESSURE: 106 MMHG | HEART RATE: 93 BPM | BODY MASS INDEX: 41.45 KG/M2 | OXYGEN SATURATION: 99 % | HEIGHT: 61 IN | TEMPERATURE: 97.7 F | DIASTOLIC BLOOD PRESSURE: 70 MMHG

## 2024-12-17 DIAGNOSIS — L02.413 CUTANEOUS ABSCESS OF RIGHT WRIST: Primary | ICD-10-CM

## 2024-12-17 PROCEDURE — 99213 OFFICE O/P EST LOW 20 MIN: CPT | Performed by: PHYSICIAN ASSISTANT

## 2024-12-17 RX ORDER — DOXYCYCLINE HYCLATE 100 MG
100 TABLET ORAL 2 TIMES DAILY
Qty: 20 TABLET | Refills: 0 | Status: SHIPPED | OUTPATIENT
Start: 2024-12-17 | End: 2024-12-27

## 2024-12-17 NOTE — PROGRESS NOTES
Chief Complaint   Insect Bite (X5 days/Right wrist /)      History of Present Illness   Source of history provided by:  patient.      Evelyn Hamm is a 44 y.o. old female presenting to the walk in clinic for evaluation of an erythematous and swollen lesion to the radial aspect of the right wrist, which began 5 days ago. Pt believes she sustained an insect bite at the site. Reports the area is now warm to touch, moderately painful, and swollen. Denies lymphangitic streaking. Denies any bleeding or active drainage. Since onset the symptoms have progressed. Patient has tried taking Pepcid, Benadryl, and using topical hydrocortisone cream OTC without symptomatic relief. Denies any fever, chills, HA, recent illness, myalgias, nausea, vomiting, or lethargy.       ROS    Unless otherwise stated in this report or unable to obtain because of the patient's clinical or mental status as evidenced by the medical record, this patients's positive and negative responses for Review of Systems, constitutional, psych, eyes, ENT, cardiovascular, respiratory, gastrointestinal, neurological, genitourinary, musculoskeletal, integument systems and systems related to the presenting problem are either stated in the preceding or were not pertinent or were negative for the symptoms and/or complaints related to the medical problem.    Past Medical History:  has a past medical history of Migraine.  Past Surgical History:  has a past surgical history that includes Hysterectomy, vaginal;  section; Arm Surgery (Right); and [place] tooth extraction.  Social History:  reports that she has never smoked. She has never used smokeless tobacco. She reports current alcohol use. She reports that she does not use drugs.  Family History: family history includes Coronary Art Dis in her father; Heart Disease in her father; High Blood Pressure in her father; High Cholesterol in her father; Hypertension in her father; No Known Problems in her brother

## 2024-12-20 RX ORDER — MUPIROCIN 20 MG/G
OINTMENT TOPICAL
Qty: 15 G | Refills: 1 | Status: SHIPPED | OUTPATIENT
Start: 2024-12-20

## 2024-12-31 DIAGNOSIS — R22.31 LOCALIZED SWELLING OF RIGHT FOREARM: ICD-10-CM
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
## 2025-01-07 DIAGNOSIS — M79.89 PALPABLE MASS OF SOFT TISSUE OF WRIST: Primary | ICD-10-CM

## 2025-01-07 RX ORDER — DOXYCYCLINE HYCLATE 100 MG
100 TABLET ORAL 2 TIMES DAILY
Qty: 28 TABLET | Refills: 0 | Status: SHIPPED | OUTPATIENT
Start: 2025-01-07 | End: 2025-01-21

## 2025-01-15 ENCOUNTER — OFFICE VISIT (OUTPATIENT)
Dept: SURGERY | Age: 45
End: 2025-01-15
Payer: COMMERCIAL

## 2025-01-15 VITALS
SYSTOLIC BLOOD PRESSURE: 128 MMHG | DIASTOLIC BLOOD PRESSURE: 80 MMHG | HEIGHT: 60 IN | OXYGEN SATURATION: 98 % | RESPIRATION RATE: 18 BRPM | BODY MASS INDEX: 41.58 KG/M2 | HEART RATE: 76 BPM | WEIGHT: 211.8 LBS

## 2025-01-15 DIAGNOSIS — L72.0 EPIDERMOID CYST OF SKIN: Primary | ICD-10-CM

## 2025-01-15 PROCEDURE — 11403 EXC TR-EXT B9+MARG 2.1-3CM: CPT | Performed by: SURGERY

## 2025-01-15 RX ORDER — LIDOCAINE HYDROCHLORIDE AND EPINEPHRINE 10; 10 MG/ML; UG/ML
20 INJECTION, SOLUTION INFILTRATION; PERINEURAL ONCE
Status: COMPLETED | OUTPATIENT
Start: 2025-01-15 | End: 2025-01-15

## 2025-01-15 RX ADMIN — LIDOCAINE HYDROCHLORIDE AND EPINEPHRINE 10 ML: 10; 10 INJECTION, SOLUTION INFILTRATION; PERINEURAL at 15:55

## 2025-01-20 LAB — SURGICAL PATHOLOGY REPORT: NORMAL

## 2025-01-20 NOTE — PROGRESS NOTES
Office Procedure:    Diagnosis: Cyst    Location: right wrist    Surgeon: Hollis Lancaster MD    Specimen: Cyst, 2.2 cm    Procedure:  After informed consent, the area was prepped in sterile fashion.  1% lidocaine with epinephrine was infiltrated circumferentially around the lesion.  Elliptical incision made and carried down through subcutaneous tissue where the lesion was dissected out. Once under the lesion, it was amputated and sent for specimen.  The wound was irrigated and wound closed with 4-0 Monocryl. Skin glue applied. Procedure was tolerated well.      Hollis Lancaster MD  01/20/25    CC: Jose Snowden APRN - NP

## 2025-01-24 ENCOUNTER — OFFICE VISIT (OUTPATIENT)
Dept: SURGERY | Age: 45
End: 2025-01-24

## 2025-01-24 VITALS
SYSTOLIC BLOOD PRESSURE: 127 MMHG | OXYGEN SATURATION: 98 % | WEIGHT: 210 LBS | HEIGHT: 60 IN | BODY MASS INDEX: 41.23 KG/M2 | RESPIRATION RATE: 18 BRPM | DIASTOLIC BLOOD PRESSURE: 88 MMHG | HEART RATE: 65 BPM

## 2025-01-24 DIAGNOSIS — L72.0 EPIDERMOID CYST OF SKIN: Primary | ICD-10-CM

## 2025-01-24 PROCEDURE — 99024 POSTOP FOLLOW-UP VISIT: CPT | Performed by: SURGERY

## 2025-01-28 NOTE — PROGRESS NOTES
[facility] Surgery Clinic Note    Assessment & Plan  1. Ruptured and inflamed cyst.  The cyst on her wrist has healed well, with no current swelling or drainage. Biopsy results confirmed the presence of a ruptured and inflamed cyst. She was advised that it is not necessary to keep the area covered, but she may use a Band-Aid if preferred. The application of Neosporin or scar revision cream was suggested for additional care.    PROCEDURE  The stitches were removed from the wrist.    Return if symptoms worsen or fail to improve.    Evelyn was seen today for follow-up.    Diagnoses and all orders for this visit:    Epidermoid cyst of skin          Chief Complaint   Patient presents with    Follow-up     1 wk follow up for exc rt wrist cyst         PCP: Jose Snowden APRN - NP  CC: No ref. provider found     Evelyn Hamm is a 44 y.o. female .    History of Present Illness  The patient presents for evaluation of a cyst on her wrist.    She reports that the cyst appears to be in a satisfactory condition, with no evidence of swelling or drainage. She expresses a preference to apply a Band-Aid over the area.       Results  Laboratory Studies  Biopsy results confirmed the presence of a ruptured and inflamed cyst.      Review of Systems   All other systems reviewed and are negative.      Past Medical History:   Diagnosis Date    Migraine        Past Surgical History:   Procedure Laterality Date    ARM SURGERY Right      SECTION      HYSTERECTOMY, VAGINAL      WISDOM TOOTH EXTRACTION         Family History   Problem Relation Age of Onset    Thyroid Disease Mother     High Cholesterol Father     Hypertension Father     Coronary Art Dis Father     Heart Disease Father     High Blood Pressure Father     Prostate Cancer Father     No Known Problems Sister     No Known Problems Brother        Social History     Socioeconomic History    Marital status:      Spouse name: Not on file    Number of

## 2025-06-23 LAB
ALBUMIN: NORMAL
ALP BLD-CCNC: NORMAL U/L
ALT SERPL-CCNC: NORMAL U/L
ANION GAP SERPL CALCULATED.3IONS-SCNC: NORMAL MMOL/L
AST SERPL-CCNC: NORMAL U/L
BILIRUB SERPL-MCNC: NORMAL MG/DL
BUN BLDV-MCNC: NORMAL MG/DL
C-PEPTIDE: NORMAL
CALCIUM SERPL-MCNC: NORMAL MG/DL
CHLORIDE BLD-SCNC: NORMAL MMOL/L
CHOLESTEROL, TOTAL: NORMAL
CHOLESTEROL/HDL RATIO: NORMAL
CO2: NORMAL
CREAT SERPL-MCNC: NORMAL MG/DL
ESTIMATED AVERAGE GLUCOSE: NORMAL
GFR, ESTIMATED: 88
GLUCOSE BLD-MCNC: NORMAL MG/DL
HBA1C MFR BLD: 5.1 %
HDLC SERPL-MCNC: NORMAL MG/DL
LDL CHOLESTEROL: NORMAL
MAGNESIUM: NORMAL
NONHDLC SERPL-MCNC: NORMAL MG/DL
POTASSIUM SERPL-SCNC: NORMAL MMOL/L
SODIUM BLD-SCNC: NORMAL MMOL/L
TOTAL PROTEIN: NORMAL
TRIGL SERPL-MCNC: NORMAL MG/DL
TSH SERPL DL<=0.05 MIU/L-ACNC: NORMAL M[IU]/L
VLDLC SERPL CALC-MCNC: NORMAL MG/DL